# Patient Record
Sex: MALE | Race: WHITE | NOT HISPANIC OR LATINO | Employment: OTHER | URBAN - METROPOLITAN AREA
[De-identification: names, ages, dates, MRNs, and addresses within clinical notes are randomized per-mention and may not be internally consistent; named-entity substitution may affect disease eponyms.]

---

## 2021-11-09 ENCOUNTER — HISTORICAL (OUTPATIENT)
Dept: RADIOLOGY | Facility: HOSPITAL | Age: 56
End: 2021-11-09

## 2023-09-24 ENCOUNTER — HOSPITAL ENCOUNTER (EMERGENCY)
Facility: HOSPITAL | Age: 58
Discharge: HOME | End: 2023-09-25
Attending: STUDENT IN AN ORGANIZED HEALTH CARE EDUCATION/TRAINING PROGRAM
Payer: COMMERCIAL

## 2023-09-24 DIAGNOSIS — K52.9 ENTERITIS: ICD-10-CM

## 2023-09-24 DIAGNOSIS — R06.02 SOB (SHORTNESS OF BREATH): ICD-10-CM

## 2023-09-24 DIAGNOSIS — R11.2 NAUSEA AND VOMITING, UNSPECIFIED VOMITING TYPE: ICD-10-CM

## 2023-09-24 DIAGNOSIS — R10.9 ABDOMINAL PAIN, UNSPECIFIED ABDOMINAL LOCATION: Primary | ICD-10-CM

## 2023-09-24 LAB
ALBUMIN SERPL-MCNC: 4 G/DL (ref 3.5–5)
ALBUMIN/GLOB SERPL: 1.2 RATIO (ref 1.1–2)
ALP SERPL-CCNC: 140 UNIT/L (ref 40–150)
ALT SERPL-CCNC: 13 UNIT/L (ref 0–55)
APPEARANCE UR: CLEAR
AST SERPL-CCNC: 16 UNIT/L (ref 5–34)
BACTERIA #/AREA URNS AUTO: NORMAL /HPF
BASOPHILS # BLD AUTO: 0.1 X10(3)/MCL
BASOPHILS NFR BLD AUTO: 0.7 %
BILIRUB SERPL-MCNC: 0.4 MG/DL
BILIRUB UR QL STRIP.AUTO: NEGATIVE
BNP BLD-MCNC: 18.2 PG/ML
BUN SERPL-MCNC: 15.4 MG/DL (ref 8.4–25.7)
CALCIUM SERPL-MCNC: 8.9 MG/DL (ref 8.4–10.2)
CHLORIDE SERPL-SCNC: 106 MMOL/L (ref 98–107)
CO2 SERPL-SCNC: 24 MMOL/L (ref 22–29)
COLOR UR AUTO: YELLOW
CREAT SERPL-MCNC: 0.89 MG/DL (ref 0.73–1.18)
EOSINOPHIL # BLD AUTO: 0.5 X10(3)/MCL (ref 0–0.9)
EOSINOPHIL NFR BLD AUTO: 3.7 %
ERYTHROCYTE [DISTWIDTH] IN BLOOD BY AUTOMATED COUNT: 14.3 % (ref 11.5–17)
GFR SERPLBLD CREATININE-BSD FMLA CKD-EPI: >60 MLS/MIN/1.73/M2
GLOBULIN SER-MCNC: 3.3 GM/DL (ref 2.4–3.5)
GLUCOSE SERPL-MCNC: 104 MG/DL (ref 74–100)
GLUCOSE UR QL STRIP.AUTO: NEGATIVE
HCT VFR BLD AUTO: 41.1 % (ref 42–52)
HGB BLD-MCNC: 13.2 G/DL (ref 14–18)
IMM GRANULOCYTES # BLD AUTO: 0.04 X10(3)/MCL (ref 0–0.04)
IMM GRANULOCYTES NFR BLD AUTO: 0.3 %
KETONES UR QL STRIP.AUTO: NEGATIVE
LEUKOCYTE ESTERASE UR QL STRIP.AUTO: NEGATIVE
LYMPHOCYTES # BLD AUTO: 1.88 X10(3)/MCL (ref 0.6–4.6)
LYMPHOCYTES NFR BLD AUTO: 13.8 %
MCH RBC QN AUTO: 26.9 PG (ref 27–31)
MCHC RBC AUTO-ENTMCNC: 32.1 G/DL (ref 33–36)
MCV RBC AUTO: 83.7 FL (ref 80–94)
MONOCYTES # BLD AUTO: 1.22 X10(3)/MCL (ref 0.1–1.3)
MONOCYTES NFR BLD AUTO: 9 %
NEUTROPHILS # BLD AUTO: 9.85 X10(3)/MCL (ref 2.1–9.2)
NEUTROPHILS NFR BLD AUTO: 72.5 %
NITRITE UR QL STRIP.AUTO: NEGATIVE
NRBC BLD AUTO-RTO: 0 %
PH UR STRIP.AUTO: 5.5 [PH]
PLATELET # BLD AUTO: 204 X10(3)/MCL (ref 130–400)
PMV BLD AUTO: 11.7 FL (ref 7.4–10.4)
POTASSIUM SERPL-SCNC: 4.1 MMOL/L (ref 3.5–5.1)
PROT SERPL-MCNC: 7.3 GM/DL (ref 6.4–8.3)
PROT UR QL STRIP.AUTO: NEGATIVE
RBC # BLD AUTO: 4.91 X10(6)/MCL (ref 4.7–6.1)
RBC #/AREA URNS AUTO: <5 /HPF
RBC UR QL AUTO: NEGATIVE
SODIUM SERPL-SCNC: 140 MMOL/L (ref 136–145)
SP GR UR STRIP.AUTO: 1.02 (ref 1–1.03)
SQUAMOUS #/AREA URNS AUTO: <5 /HPF
TROPONIN I SERPL-MCNC: <0.01 NG/ML (ref 0–0.04)
UROBILINOGEN UR STRIP-ACNC: 0.2
WBC # SPEC AUTO: 13.59 X10(3)/MCL (ref 4.5–11.5)
WBC #/AREA URNS AUTO: <5 /HPF

## 2023-09-24 PROCEDURE — 80053 COMPREHEN METABOLIC PANEL: CPT | Performed by: STUDENT IN AN ORGANIZED HEALTH CARE EDUCATION/TRAINING PROGRAM

## 2023-09-24 PROCEDURE — 83880 ASSAY OF NATRIURETIC PEPTIDE: CPT | Performed by: STUDENT IN AN ORGANIZED HEALTH CARE EDUCATION/TRAINING PROGRAM

## 2023-09-24 PROCEDURE — 84484 ASSAY OF TROPONIN QUANT: CPT | Performed by: STUDENT IN AN ORGANIZED HEALTH CARE EDUCATION/TRAINING PROGRAM

## 2023-09-24 PROCEDURE — 93005 ELECTROCARDIOGRAM TRACING: CPT

## 2023-09-24 PROCEDURE — 99285 EMERGENCY DEPT VISIT HI MDM: CPT | Mod: 25

## 2023-09-24 PROCEDURE — 81001 URINALYSIS AUTO W/SCOPE: CPT | Performed by: STUDENT IN AN ORGANIZED HEALTH CARE EDUCATION/TRAINING PROGRAM

## 2023-09-24 PROCEDURE — 85025 COMPLETE CBC W/AUTO DIFF WBC: CPT | Performed by: STUDENT IN AN ORGANIZED HEALTH CARE EDUCATION/TRAINING PROGRAM

## 2023-09-25 VITALS
DIASTOLIC BLOOD PRESSURE: 62 MMHG | HEART RATE: 68 BPM | BODY MASS INDEX: 30.06 KG/M2 | WEIGHT: 210 LBS | RESPIRATION RATE: 18 BRPM | TEMPERATURE: 99 F | OXYGEN SATURATION: 100 % | HEIGHT: 70 IN | SYSTOLIC BLOOD PRESSURE: 124 MMHG

## 2023-09-25 PROCEDURE — 96375 TX/PRO/DX INJ NEW DRUG ADDON: CPT

## 2023-09-25 PROCEDURE — 96374 THER/PROPH/DIAG INJ IV PUSH: CPT | Mod: 59

## 2023-09-25 PROCEDURE — 96361 HYDRATE IV INFUSION ADD-ON: CPT

## 2023-09-25 PROCEDURE — 25500020 PHARM REV CODE 255: Performed by: STUDENT IN AN ORGANIZED HEALTH CARE EDUCATION/TRAINING PROGRAM

## 2023-09-25 PROCEDURE — 63600175 PHARM REV CODE 636 W HCPCS: Performed by: STUDENT IN AN ORGANIZED HEALTH CARE EDUCATION/TRAINING PROGRAM

## 2023-09-25 RX ORDER — ONDANSETRON 2 MG/ML
4 INJECTION INTRAMUSCULAR; INTRAVENOUS
Status: COMPLETED | OUTPATIENT
Start: 2023-09-25 | End: 2023-09-25

## 2023-09-25 RX ORDER — HYDROMORPHONE HYDROCHLORIDE 2 MG/ML
1 INJECTION, SOLUTION INTRAMUSCULAR; INTRAVENOUS; SUBCUTANEOUS
Status: COMPLETED | OUTPATIENT
Start: 2023-09-25 | End: 2023-09-25

## 2023-09-25 RX ORDER — HYDROCODONE BITARTRATE AND ACETAMINOPHEN 5; 325 MG/1; MG/1
1 TABLET ORAL EVERY 12 HOURS PRN
Qty: 10 TABLET | Refills: 0 | Status: SHIPPED | OUTPATIENT
Start: 2023-09-25 | End: 2023-09-30

## 2023-09-25 RX ORDER — ONDANSETRON 4 MG/1
4 TABLET, ORALLY DISINTEGRATING ORAL EVERY 8 HOURS PRN
Qty: 15 TABLET | Refills: 0 | Status: SHIPPED | OUTPATIENT
Start: 2023-09-25 | End: 2023-09-30

## 2023-09-25 RX ADMIN — ONDANSETRON 4 MG: 2 INJECTION INTRAMUSCULAR; INTRAVENOUS at 01:09

## 2023-09-25 RX ADMIN — HYDROMORPHONE HYDROCHLORIDE 1 MG: 2 INJECTION INTRAMUSCULAR; INTRAVENOUS; SUBCUTANEOUS at 01:09

## 2023-09-25 RX ADMIN — IOPAMIDOL 100 ML: 755 INJECTION, SOLUTION INTRAVENOUS at 01:09

## 2023-09-25 RX ADMIN — SODIUM CHLORIDE, POTASSIUM CHLORIDE, SODIUM LACTATE AND CALCIUM CHLORIDE 1000 ML: 600; 310; 30; 20 INJECTION, SOLUTION INTRAVENOUS at 01:09

## 2023-09-25 NOTE — DISCHARGE INSTRUCTIONS
Follow-up with the primary care physician.     Keep existing appointment with your gastroenterologist for colonoscopy and endoscopy.      Return to the emergency department if any fever, worsening abdominal pain, nausea, vomiting, difficulty breathing, headache, or any other concerns.

## 2023-09-25 NOTE — ED PROVIDER NOTES
"Encounter Date: 9/24/2023    SCRIBE #1 NOTE: I, Ian Teresadioneeleonora, am scribing for, and in the presence of,  Ángel Newsome MD. I have scribed the following portions of the note - Other sections scribed: HPI,ROS,PE.       History     Chief Complaint   Patient presents with    Abdominal Pain     RL and LL Quadrant pain that radiates to his back x 4 hours, w/ intermittent nausea. denies fever, dysuria, hematuria, vomiting. Pt denied any medical hx.. When told the waiting time, pts wife then stated he was SOB, and became very rude, stating "You didn't ask if he was actually SOB", when pt was asked what specifically prompted him to the ED, his initial complaint was only lower abd pain, pt and wife very rude in triage.      57 y/o male with PMHx of GERD presents to ED c/o abdominal pain onset 9/24. Pt reports associated symptoms of nausea and diarrhea. He denies any fever or vomiting. He reports the pain radiates to his back. He states he has a hiatal hernia. He reports having 2x hernia surgeries in the past.    The history is provided by the patient. No  was used.   Abdominal Pain  The current episode started today. The abdominal pain is generalized. The abdominal pain radiates to the back. The other symptoms of the illness include nausea and diarrhea. The other symptoms of the illness do not include fever, shortness of breath, vomiting or dysuria.   The diarrhea began today.   Significant associated medical issues include GERD.     Review of patient's allergies indicates:   Allergen Reactions    Latex, natural rubber     Opioids - morphine analogues Hives    Promethazine      "Feels like someone takes an ice pick to my joints"    Soma [carisoprodol]      History reviewed. No pertinent past medical history.  History reviewed. No pertinent surgical history.  History reviewed. No pertinent family history.     Review of Systems   Constitutional:  Negative for fever.   HENT:  Negative for sore throat.  "   Eyes:  Negative for visual disturbance.   Respiratory:  Negative for shortness of breath.    Cardiovascular:  Negative for chest pain.   Gastrointestinal:  Positive for abdominal pain, diarrhea and nausea. Negative for vomiting.   Genitourinary:  Negative for dysuria.   Musculoskeletal:  Negative for joint swelling.   Skin:  Negative for rash.   Neurological:  Negative for weakness.   Psychiatric/Behavioral:  Negative for confusion.    All other systems reviewed and are negative.      Physical Exam     Initial Vitals [09/24/23 2110]   BP Pulse Resp Temp SpO2   138/83 76 19 98.6 °F (37 °C) 98 %      MAP       --         Physical Exam    Nursing note and vitals reviewed.  Constitutional: He appears well-developed and well-nourished. He is not diaphoretic. No distress.   HENT:   Head: Normocephalic and atraumatic.   Eyes: Conjunctivae and EOM are normal. Pupils are equal, round, and reactive to light.   Neck:   Normal range of motion.  Cardiovascular:  Normal rate, regular rhythm, normal heart sounds and intact distal pulses.           No murmur heard.  Pulmonary/Chest: Breath sounds normal. No respiratory distress. He has no wheezes. He has no rales.   Abdominal: Abdomen is soft. He exhibits no distension. There is abdominal tenderness (diffuse).   Musculoskeletal:         General: No tenderness or edema. Normal range of motion.      Cervical back: Normal range of motion.      Comments: Left BKA     Neurological: He is alert and oriented to person, place, and time. No cranial nerve deficit.   Skin: Skin is warm and dry. Capillary refill takes less than 2 seconds. No rash noted. No erythema.   Psychiatric: He has a normal mood and affect.         ED Course   Procedures  Labs Reviewed   COMPREHENSIVE METABOLIC PANEL - Abnormal; Notable for the following components:       Result Value    Glucose Level 104 (*)     All other components within normal limits   CBC WITH DIFFERENTIAL - Abnormal; Notable for the following  components:    WBC 13.59 (*)     Hgb 13.2 (*)     Hct 41.1 (*)     MCH 26.9 (*)     MCHC 32.1 (*)     MPV 11.7 (*)     Neut # 9.85 (*)     All other components within normal limits   URINALYSIS, REFLEX TO URINE CULTURE - Normal   B-TYPE NATRIURETIC PEPTIDE - Normal   TROPONIN I - Normal   URINALYSIS, MICROSCOPIC - Normal   CBC W/ AUTO DIFFERENTIAL    Narrative:     The following orders were created for panel order CBC auto differential.  Procedure                               Abnormality         Status                     ---------                               -----------         ------                     CBC with Differential[[id number]]       Abnormal            Final result                 Please view results for these tests on the individual orders.     EKG Readings: (Independently Interpreted)   Initial Reading: No STEMI. Rhythm: Normal Sinus Rhythm. Heart Rate: 80. Ectopy: No Ectopy. Conduction: LAFB. ST Segments: Normal ST Segments. T Waves: Normal. Axis: Normal. Clinical Impression: Left Ventricular Hypertrophy (LDH)   Taken at 2113.       Imaging Results              CT Abdomen Pelvis With Contrast (Preliminary result)  Result time 09/25/23 01:36:09      Preliminary result by Ramón Mejía MD (09/25/23 01:36:09)                   Narrative:    START OF REPORT:  Technique: CT of the abdomen and pelvis was performed with axial images as well as sagittal and coronal reconstruction images with intravenous contrast.    Comparison: Comparison is with study dated 2021-11-09 17:51:01.    Clinical History: RL and LL Quadrant pain that radiates to his back x 4 hours, w/ intermittent nausea. denies fever, dysuria, hematuria, vomiting. Pt denied any medical hx.    Dosage Information: Automated Exposure Control was utilized.    Findings:  Thorax:  Lungs: There is mild nonspecific dependent change at the lung bases.  Pleura: No effusions or thickening are seen.  Heart: The heart size is within normal  limits.  Abdomen:  Abdominal Wall: Multiple surgical clips are again seen in the ventral mid and lower abdominal wall.  Liver: The liver appears unremarkable.  Biliary System: No intrahepatic or extrahepatic biliary duct dilatation is seen.  Gallbladder: The gallbladder is not identified. Please correlate with surgically history.  Pancreas: The pancreas appears unremarkable.  Spleen: The spleen appears unremarkable.  Adrenals: The adrenal glands appear unremarkable.  Kidneys: The kidneys appear unremarkable with no stones cysts masses or hydronephrosis.  Aorta: There is mild calcification of the abdominal aorta and its branches.  IVC: Unremarkable. An IVC filter is in place. Similar findings are also noted on the prior examination.  Bowel: Small bowel anastomotic sutures are again seen in the anterior mid abdomen. There is mild focal distention of the small bowel loop at the anastomotic site, and may reflect an atonic segment. There is mild circumferential wall thickening of multiple jejunal loops in the left mid abdomen. There is surrounding fat stranding with trace interloop free fluid (series 2, images 38-50). These findings may reflect enteritis. The rest of the bowel loops appear unremarkable. No definitive bowel obstruction is identified.  Esophagus: The visualized esophagus appears unremarkable.  Stomach: Postoperative changes are again identified in the stomach, consistent with gastric sleeve.  Duodenum: Unremarkable appearing duodenum.  Colon: There is moderate stool in the colon which could reflect an element of constipation.  Appendix: No appendix is identified.  Peritoneum: No free intraperitoneal air is seen.    Pelvis:  Bladder: The bladder appears unremarkable.  Male:  Prostate gland: The prostate gland appears unremarkable.    Bony structures:  Dorsal Spine: There is mild spondylosis of the visualized dorsal spine. There are pars interarticularis defects at L5 bilaterally with grade I anterolisthesis  of L5 over S1. Similar findings are also noted on the prior examination.  Bony Pelvis: The visualized bony structures of the pelvis appear unremarkable.      Impression:  1. Small bowel anastomotic sutures are again seen in the anterior mid abdomen. There is mild focal distention of the small bowel loop at the anastomotic site, and may reflect an atonic segment. There is mild circumferential wall thickening of multiple jejunal loops in the left mid abdomen. There is surrounding fat stranding with trace interloop free fluid (series 2, images 38-50). These findings may reflect enteritis.  2. Details and other findings as discussed above.                                         Medications   lactated ringers bolus 1,000 mL (0 mLs Intravenous Stopped 9/25/23 0247)   HYDROmorphone (PF) injection 1 mg (1 mg Intravenous Given 9/25/23 0145)   ondansetron injection 4 mg (4 mg Intravenous Given 9/25/23 0146)   iopamidoL (ISOVUE-370) injection 100 mL (100 mLs Intravenous Given 9/25/23 0133)             Scribe Attestation:   Scribe #1: I performed the above scribed service and the documentation accurately describes the services I performed. I attest to the accuracy of the note.    Attending Attestation:           Physician Attestation for Scribe:  Physician Attestation Statement for Scribe #1: I, Ángel Newsome MD, reviewed documentation, as scribed by Ian You in my presence, and it is both accurate and complete.         Medical Decision Making  Problems Addressed:  Abdominal pain, unspecified abdominal location: acute illness or injury that poses a threat to life or bodily functions  Enteritis: acute illness or injury that poses a threat to life or bodily functions  Nausea and vomiting, unspecified vomiting type: acute illness or injury that poses a threat to life or bodily functions  SOB (shortness of breath): acute illness or injury that poses a threat to life or bodily functions    Amount and/or Complexity of Data  Reviewed  Labs: ordered.  Radiology: ordered and independent interpretation performed.  ECG/medicine tests: ordered and independent interpretation performed.    Risk  Prescription drug management.  Parenteral controlled substances.                      Medical Decision Making:   History:   I obtained history from: someone other than patient.       <> Summary of History: Collateral from family member at bedside.  Old Medical Records: I decided to obtain old medical records.  Old Records Summarized: records from clinic visits, records from previous admission(s) and records from another hospital.       <> Summary of Records: Reviewed old records, history of previous hernia surgery.  Initial Assessment:   Abdominal pain  Differential Diagnosis:   Judging by the patient's chief complaint and pertinent history, the patient has the following possible differential diagnoses, including but not limited to the following.  Some of these are deemed to be lower likelihood and some more likely based on my physical exam and history combined with possible lab work and/or imaging studies.   Please see the pertinent studies, and refer to the HPI.  Some of these diagnoses will take further evaluation to fully rule out, perhaps as an outpatient and the patient was encouraged to follow up when discharged for more comprehensive evaluation.    Small-bowel obstruction, appendicitis, biliary disease, diverticulitis, ACS, intraabdominal abscess, retroperitoneal abscess, gastritis, gastroenteritis, hepatitis, hernia, pancreatitis, inflammatory bowel disease, PUD, constipation, nephrolithiasis, GERD, IBS    Independently Interpreted Test(s):   I have ordered and independently interpreted EKG Reading(s) - see prior notes  Clinical Tests:   Lab Tests: Ordered and Reviewed  Radiological Study: Reviewed and Ordered  Medical Tests: Reviewed and Ordered  ED Management:    Patient is a 58-year-old male who presents to emergency department for diffuse  abdominal pain.  See HPI.  See physical exam.  Given IV fluids, pain and nausea medication with improvement in his symptoms.  Lab work as noted.  Imaging obtained with evidence of enteritis.  No definitive bowel obstruction noted.  Patient resting comfortably on reassessment.  In no acute distress.  Nausea controlled. Reassessed patient.  Patient is resting comfortably.  Discussed all results.  Discussed need for follow-up.  Discussed return precautions.  Answered all questions at this time.  Hemodynamically stable for continued outpatient management with strict return precautions.  Patient and family verbalized understanding agreed to plan.  Patient has a follow-up appointment with a gastroenterologist.  Patient has a follow-up appointment with plan for colonoscopy and endoscopy on October 10th.        Clinical Impression:   Final diagnoses:  [R06.02] SOB (shortness of breath)  [R10.9] Abdominal pain, unspecified abdominal location - periumbilical (Primary)  [R11.2] Nausea and vomiting, unspecified vomiting type  [K52.9] Enteritis        ED Disposition Condition    Discharge Stable          ED Prescriptions       Medication Sig Dispense Start Date End Date Auth. Provider    HYDROcodone-acetaminophen (NORCO) 5-325 mg per tablet Take 1 tablet by mouth every 12 (twelve) hours as needed for Pain. 10 tablet 9/25/2023 9/30/2023 Ángel Newsome MD    ondansetron (ZOFRAN-ODT) 4 MG TbDL Take 1 tablet (4 mg total) by mouth every 8 (eight) hours as needed. 15 tablet 9/25/2023 9/30/2023 Ángel Newsome MD          Follow-up Information    None          Ángel Newsome MD  09/25/23 2127

## 2024-03-12 ENCOUNTER — HOSPITAL ENCOUNTER (EMERGENCY)
Facility: HOSPITAL | Age: 59
Discharge: HOME | End: 2024-03-12
Attending: STUDENT IN AN ORGANIZED HEALTH CARE EDUCATION/TRAINING PROGRAM
Payer: COMMERCIAL

## 2024-03-12 VITALS
RESPIRATION RATE: 20 BRPM | WEIGHT: 205 LBS | HEART RATE: 87 BPM | TEMPERATURE: 98 F | OXYGEN SATURATION: 95 % | HEIGHT: 70 IN | SYSTOLIC BLOOD PRESSURE: 136 MMHG | BODY MASS INDEX: 29.35 KG/M2 | DIASTOLIC BLOOD PRESSURE: 77 MMHG

## 2024-03-12 DIAGNOSIS — S82.122D CLOSED FRACTURE OF LATERAL PORTION OF LEFT TIBIAL PLATEAU WITH ROUTINE HEALING, SUBSEQUENT ENCOUNTER: Primary | ICD-10-CM

## 2024-03-12 PROCEDURE — 29505 APPLICATION LONG LEG SPLINT: CPT | Mod: LT

## 2024-03-12 PROCEDURE — 25000003 PHARM REV CODE 250: Performed by: PHYSICIAN ASSISTANT

## 2024-03-12 PROCEDURE — 99284 EMERGENCY DEPT VISIT MOD MDM: CPT | Mod: 25

## 2024-03-12 RX ORDER — HYDROCODONE BITARTRATE AND ACETAMINOPHEN 10; 325 MG/1; MG/1
1 TABLET ORAL
Status: COMPLETED | OUTPATIENT
Start: 2024-03-12 | End: 2024-03-12

## 2024-03-12 RX ORDER — HYDROCODONE BITARTRATE AND ACETAMINOPHEN 10; 325 MG/1; MG/1
1 TABLET ORAL EVERY 6 HOURS PRN
Qty: 20 TABLET | Refills: 0 | Status: SHIPPED | OUTPATIENT
Start: 2024-03-12 | End: 2024-03-17

## 2024-03-12 RX ADMIN — HYDROCODONE BITARTRATE AND ACETAMINOPHEN 1 TABLET: 10; 325 TABLET ORAL at 03:03

## 2024-03-12 NOTE — ED PROVIDER NOTES
"Encounter Date: 3/12/2024       History     Chief Complaint   Patient presents with    Fall     Pt reports falling onto BKA on L leg today. Went to UC and told he has a fx and told to go to PCP. Pt reports unable to see PCP today so came here       58 y.o. male with a history of left BKA presents to the ED with left leg pain s/t fall this morning. States he went to urgent care and had XR that showed possible fracture of left tibial stump; told to f/u with PCP however they are closed.  Patient states they gave him a tramadol however he took that with no symptom relief.  Denies any other injury.  Notes swelling to the stump as well.    The history is provided by the patient. No  was used.     Review of patient's allergies indicates:   Allergen Reactions    Latex, natural rubber     Opioids - morphine analogues Hives    Promethazine      "Feels like someone takes an ice pick to my joints"    Soma [carisoprodol]      No past medical history on file.  No past surgical history on file.  No family history on file.     Review of Systems   Constitutional:  Negative for fever.   HENT:  Negative for sore throat.    Respiratory:  Negative for shortness of breath.    Cardiovascular:  Negative for chest pain.   Gastrointestinal:  Negative for nausea.   Genitourinary:  Negative for dysuria.   Musculoskeletal:  Positive for arthralgias. Negative for back pain.   Skin:  Negative for rash.   Neurological:  Negative for weakness.   Hematological:  Does not bruise/bleed easily.   All other systems reviewed and are negative.      Physical Exam     Initial Vitals [03/12/24 1520]   BP Pulse Resp Temp SpO2   136/77 87 18 98.3 °F (36.8 °C) 95 %      MAP       --         Physical Exam    Nursing note and vitals reviewed.  Constitutional: He appears well-developed and well-nourished.   HENT:   Head: Normocephalic and atraumatic.   Eyes: EOM are normal. Pupils are equal, round, and reactive to light.   Neck: Neck supple. "   Normal range of motion.  Cardiovascular:  Normal rate, regular rhythm, normal heart sounds and intact distal pulses.           Pulmonary/Chest: Breath sounds normal.   Musculoskeletal:         General: Normal range of motion.      Cervical back: Normal range of motion and neck supple.      Comments: Left BKA noted, swelling and tenderness upon palpation to the stump, sensation intact, no erythema or wound noted     Neurological: He is alert and oriented to person, place, and time. He has normal strength. GCS score is 15. GCS eye subscore is 4. GCS verbal subscore is 5. GCS motor subscore is 6.   Skin: Skin is warm and dry. Capillary refill takes less than 2 seconds.   Psychiatric: He has a normal mood and affect.         ED Course   Procedures  Labs Reviewed - No data to display       Imaging Results               CT Knee Without Contrast Left (Final result)  Result time 03/12/24 16:08:36      Final result by Silvino Barrera MD (03/12/24 16:08:36)                   Narrative:    EXAMINATION  CT KNEE WITHOUT CONTRAST LEFT    CLINICAL HISTORY  Fracture, knee;    TECHNIQUE  Non-contrast helical-acquisition CT images of the left knee were obtained.  Multiplanar reconstructions accomplished by a CT technologist at a separate workstation, pushed to PACS for physician review.    COMPARISON  No prior cross-sectional imaging or baseline radiographs are available at the time of initial interpretation.    FINDINGS  Images were reviewed in bone and soft tissue windows.    Exam quality: adequate for evaluation    There is widespread appearance of bone demineralization, which could limit sensitivity for detection of subtle, nondisplaced fractures.  Region arthritic changes are noted, as well as alterations of the below the knee amputation.  No definite acutely displaced fracture is identified.  However, there is nondisplaced focal cortical disruption with subjacent trabecular irregularity through the lateral tibial plateau  (series 8, images 65-80; series 10, images 35-41; series 12, images 39-46).  No associated tibial plateau depression is identified.  No other convincing evidence of acute acute osseous injury appreciated.  Visualized joints are congruent.  There is a moderate knee effusion with associated fat-fluid level indicative of lipohemarthrosis.    Disorganized fluid consistent with contusion and edema noted through the amputation stump subcutaneous tissues.  There is no expansile hematoma or evidence of drainable fluid collection.  Regional musculature and tendinous structures are grossly intact by limited CT assessment.  Widespread appearance of muscle fatty atrophy noted, predominately involving the medial quadriceps and hamstring components.    IMPRESSION  1. Subtle, nondisplaced fracture of the lateral tibial plateau with associated moderate lipohemarthrosis.  2. No other convincing acute osseous injury is identified.  3. Additional secondary details discussed above.  ==========    This report was flagged in Epic as abnormal.    RADIATION DOSE  Automated tube current modulation, weight-based exposure dosing, and/or iterative reconstruction technique utilized to reach lowest reasonably achievable exposure rate.    DLP: 142 mGy*cm      Electronically signed by: Silvino Barrera  Date:    03/12/2024  Time:    16:08                                     Medications   HYDROcodone-acetaminophen  mg per tablet 1 tablet (1 tablet Oral Given 3/12/24 1530)     Medical Decision Making  Differential diagnosis:  Fracture, contusion     58 y.o. male with a history of left BKA presents to the ED with left leg pain s/t fall this morning. States he went to urgent care and had XR that showed possible fracture of left tibial stump; told to f/u with PCP however they are closed.  Patient states they gave him a tramadol however he took that with no symptom relief.  Denies any other injury.  Notes swelling to the stump as well.      Amount  and/or Complexity of Data Reviewed  Radiology: ordered.    Risk  Prescription drug management.               ED Course as of 03/12/24 1800   Tue Mar 12, 2024   1623 Spoke to Dr Moctezuma with ortho, will look at films [MA]   1625 Recommends place patient in immobilizer, remain NWB and go to office on Thursday at 8am  [MA]   1755 Knee immobilizer in place. States pain Is starting to ease up. Will d/c home with ortho instructions as well as pain  meds. States he has ibuprofen 800 at home that he can alternate as well  [MA]      ED Course User Index  [MA] Michael Morris PA-C                           Clinical Impression:  Final diagnoses:  [S82.122D] Closed fracture of lateral portion of left tibial plateau with routine healing, subsequent encounter (Primary)          ED Disposition Condition    Discharge Stable          ED Prescriptions       Medication Sig Dispense Start Date End Date Auth. Provider    HYDROcodone-acetaminophen (NORCO)  mg per tablet Take 1 tablet by mouth every 6 (six) hours as needed for Pain. 20 tablet 3/12/2024 3/17/2024 Michael Morris PA-C          Follow-up Information       Follow up With Specialties Details Why Contact Jus Doll, DO Orthopedic Surgery On 3/14/2024 follow up with Dr Moctezuma on Thursday at 8am [address]  [address]  [facility] [address]  [phone]      [facility] - Emergency Dept Emergency Medicine In 1 week If symptoms worsen [address] [facility] [id number] [phone]             Michael Morris PA-C  03/12/24 7103

## 2024-03-14 ENCOUNTER — OFFICE VISIT (OUTPATIENT)
Dept: ORTHOPEDICS | Facility: CLINIC | Age: 59
End: 2024-03-14
Payer: COMMERCIAL

## 2024-03-14 DIAGNOSIS — S82.122D CLOSED FRACTURE OF LATERAL PORTION OF LEFT TIBIAL PLATEAU WITH ROUTINE HEALING, SUBSEQUENT ENCOUNTER: ICD-10-CM

## 2024-03-14 PROCEDURE — 99499 UNLISTED E&M SERVICE: CPT | Mod: ,,, | Performed by: ORTHOPAEDIC SURGERY

## 2024-03-14 PROCEDURE — 27530 TREAT KNEE FRACTURE: CPT | Mod: LT,,, | Performed by: ORTHOPAEDIC SURGERY

## 2024-03-14 RX ORDER — TESTOSTERONE 25 MG/2.5G
GEL TRANSDERMAL
COMMUNITY

## 2024-03-14 RX ORDER — PANTOPRAZOLE SODIUM 40 MG/1
40 TABLET, DELAYED RELEASE ORAL
COMMUNITY
Start: 2024-02-07

## 2024-03-14 RX ORDER — LISINOPRIL 10 MG/1
10 TABLET ORAL
COMMUNITY
Start: 2024-02-06

## 2024-03-14 RX ORDER — OXYCODONE AND ACETAMINOPHEN 10; 325 MG/1; MG/1
1 TABLET ORAL EVERY 6 HOURS PRN
Qty: 12 TABLET | Refills: 0 | Status: SHIPPED | OUTPATIENT
Start: 2024-03-14 | End: 2024-03-17

## 2024-03-14 RX ORDER — GABAPENTIN 100 MG/1
200 CAPSULE ORAL 3 TIMES DAILY
COMMUNITY
Start: 2024-03-13

## 2024-03-14 NOTE — PROGRESS NOTES
Subjective:       Patient ID: Wilbert Valentin is a 58 y.o. male.  Chief Complaint   Patient presents with    Injury     3 days f/u, Lt tibial plateau fx, states was crutching at 5 am when he was going too fast causing him to fall on stump, states was given pain meds for relief, ambulating with crutches today, presents with brace on stump, has no other complaints at this time,          HPI:  Patient is 3 days out from a left tibial plateau fracture.  He was going to the bathroom in the morning tripped and fell directly in the left stump.  No soft tissue injury.  He has been a below-the-knee amputee for some time now been doing very well.  States his prosthesis fits very well has a suction type device.  He reports today with a significant other.  He has dull achy pain in the area but overall it is mild.    ROS:  Constitutional: Denies fever chills  Eyes: No change in vision  ENT: No ringing or current infections  CV: No chest pain  Resp: No labored breathing  MSK: Pain evident at site of injury located in HPI,   Integ: No signs of abrasions or lacerations  Neuro: No numbness or tingling  Lymphatic: No swelling outside the area of injury     Past Medical History:   Diagnosis Date    Hypertension       Past Surgical History:   Procedure Laterality Date    BELOW KNEE AMPUTATION OF LOWER EXTREMITY Left       Current Outpatient Medications on File Prior to Visit   Medication Sig Dispense Refill    gabapentin (NEURONTIN) 100 MG capsule Take 200 mg by mouth 3 (three) times daily.      HYDROcodone-acetaminophen (NORCO)  mg per tablet Take 1 tablet by mouth every 6 (six) hours as needed for Pain. 20 tablet 0    lisinopriL 10 MG tablet Take 10 mg by mouth.      pantoprazole (PROTONIX) 40 MG tablet 40 mg.      testosterone 1 % (25 mg/2.5gram) GlPk testosterone Take No date recorded No form recorded No frequency recorded No route recorded No set duration recorded No set duration amount recorded active No dosage strength  recorded No dosage strength units of measure recorded       No current facility-administered medications on file prior to visit.      Family History   Problem Relation Age of Onset    No Known Problems Mother     No Known Problems Father       Social History     Tobacco Use    Smoking status: Never    Smokeless tobacco: Never   Substance Use Topics    Alcohol use: Not Currently    Drug use: Never      No LMP for male patient.          Objective:      There were no vitals taken for this visit.  General the patient is alert and oriented x3 no acute distress nontoxic-appearing appropriate affect.    Constitutional: Vital signs are examined and stable.  Resp: No signs of labored breathing                 LLE: -Skin: No signs of new abrasions or lacerations, no scars. No skin breakdown           -MSK: Hip and Knee F/E,  5/5           -Neuro:  Sensation grossly intact           -Lymphatic: No signs of lymphadenopathy           -CV: Capillary refill is less than 2 seconds   .   There is no height or weight on file to calculate BMI.  Patient weight not recorded  Lab Results   Component Value Date     09/24/2023    K 4.1 09/24/2023    CO2 24 09/24/2023    CALCIUM 8.9 09/24/2023    BUN 15.4 09/24/2023      Lab Results   Component Value Date    HGB 13.2 (L) 09/24/2023    HCT 41.1 (L) 09/24/2023             Assessment:         1. Closed fracture of lateral portion of left tibial plateau with routine healing, subsequent encounter  Ambulatory referral/consult to Orthopedics              Plan:         No follow-ups on file.    Wilbert was seen today for injury.    Diagnoses and all orders for this visit:    Closed fracture of lateral portion of left tibial plateau with routine healing, subsequent encounter  -     Ambulatory referral/consult to Orthopedics          Independent Radiology ordered by other provider:  CT scan left knee showing a left lateral plateau fracture nondisplaced.  Patient above-the-knee amputation small  distal segment severe bone quality    Patient was like a new pair of crutches.  He has had multiple surgeries on his left stump over the years that has been doing well for at least 3 years.  He had a fall directly onto his left tibia does appear he has a fracture although relatively nondisplaced this area of his body is partially nonweightbearing.  We will recommend fracture care nonweightbearing for 8 weeks.  Follow up in 6-8 weeks.        This note/OR report was created with the assistance of  voice recognition software or phone  dictation.  There may be transcription errors as a result of using this technology however minimal. Effort has been made to assure accuracy of transcription but any obvious errors or omissions should be clarified with the author of the document.     No future appointments.        Jus Moctezuma DO  Orthopedic Trauma Surgery  03/14/2024

## 2024-05-13 ENCOUNTER — OFFICE VISIT (OUTPATIENT)
Dept: ORTHOPEDICS | Facility: CLINIC | Age: 59
End: 2024-05-13
Payer: MEDICARE

## 2024-05-13 ENCOUNTER — HOSPITAL ENCOUNTER (OUTPATIENT)
Dept: RADIOLOGY | Facility: CLINIC | Age: 59
Discharge: HOME | End: 2024-05-13
Attending: ORTHOPAEDIC SURGERY
Payer: COMMERCIAL

## 2024-05-13 VITALS
DIASTOLIC BLOOD PRESSURE: 79 MMHG | WEIGHT: 205 LBS | HEIGHT: 70 IN | HEART RATE: 80 BPM | BODY MASS INDEX: 29.35 KG/M2 | SYSTOLIC BLOOD PRESSURE: 135 MMHG

## 2024-05-13 DIAGNOSIS — S82.122D CLOSED FRACTURE OF LATERAL PORTION OF LEFT TIBIAL PLATEAU WITH ROUTINE HEALING, SUBSEQUENT ENCOUNTER: Primary | ICD-10-CM

## 2024-05-13 DIAGNOSIS — S82.122D CLOSED FRACTURE OF LATERAL PORTION OF LEFT TIBIAL PLATEAU WITH ROUTINE HEALING, SUBSEQUENT ENCOUNTER: ICD-10-CM

## 2024-05-13 PROCEDURE — 99024 POSTOP FOLLOW-UP VISIT: CPT | Mod: POP,,, | Performed by: PHYSICIAN ASSISTANT

## 2024-05-13 PROCEDURE — 73560 X-RAY EXAM OF KNEE 1 OR 2: CPT | Mod: LT,,, | Performed by: ORTHOPAEDIC SURGERY

## 2024-05-13 NOTE — PROGRESS NOTES
"Subjective:       Patient ID: Wilbert Valentin is a 58 y.o. male.  Chief Complaint   Patient presents with    Follow-up     8.5 wks f/u, Lt tib plateau fx. [facility] 3/14/24-6/12/24 ( left BKA), denies pain, ambulating with crutches, states has been doing stump , states swelling has gone down a lot, ready for prosthesis, has no other complaints at this time,         HPI    Patient presents for 8.5 week follow up lateral tibial plateau fracture left knee. He does have a BKA to this side. He is eager to begin progressive weight bearing with his prosthesis. He does have an orthotist that can help him with fitting. He has no pain at the fracture site today. Good range of motion. Otherwise no complaints. Swellign is greatly improved overall anc dontinues to use stump shaper.    ROS:  Constitutional: Denies fever chills  Eyes: No change in vision  ENT: No ringing or current infections  CV: No chest pain  Resp: No labored breathing  MSK: Pain evident at site of injury located in HPI,   Integ: No signs of abrasions or lacerations  Neuro: No numbness or tingling  Lymphatic: No swelling outside the area of injury     Current Outpatient Medications on File Prior to Visit   Medication Sig Dispense Refill    gabapentin (NEURONTIN) 100 MG capsule Take 200 mg by mouth 3 (three) times daily.      lisinopriL 10 MG tablet Take 10 mg by mouth.      pantoprazole (PROTONIX) 40 MG tablet 40 mg.      testosterone 1 % (25 mg/2.5gram) GlPk testosterone Take No date recorded No form recorded No frequency recorded No route recorded No set duration recorded No set duration amount recorded active No dosage strength recorded No dosage strength units of measure recorded       No current facility-administered medications on file prior to visit.          Objective:      /79   Pulse 80   Ht 5' 10" (1.778 m)   Wt 93 kg (205 lb 0.4 oz)   BMI 29.42 kg/m²   Physical Exam  General the patient is alert and oriented x3 no acute distress " "nontoxic-appearing appropriate affect.    Constitutional: Vital signs are examined and stable.  Resp: No signs of labored breathing    LLE: -Skin: Stump shaper in place, No signs of new abrasions or lacerations, no scars           -MSK: Hip and Knee F/E, EHL/FHL           -Neuro:  Sensation intact to light touch to BKA stump and fracture site           -Lymphatic: No signs of lymphadenopathy           -CV: skin is pink and well perfused grossly        Body mass index is 29.42 kg/m².  Ideal body weight: 73 kg (160 lb 15 oz)  Adjusted ideal body weight: 81 kg (178 lb 9.2 oz)  No results found for: "HGBA1C"  Hgb   Date Value Ref Range Status   09/24/2023 13.2 (L) 14.0 - 18.0 g/dL Final   07/24/2019 12.9 (L) 14.0 - 18.0 gm/dL Final     Hct   Date Value Ref Range Status   09/24/2023 41.1 (L) 42.0 - 52.0 % Final   07/24/2019 43.0 42.0 - 52.0 % Final     No results found for: "IRON"  No components found for: "FROLATE"  No results found for: "[id number]"  WBC   Date Value Ref Range Status   09/24/2023 13.59 (H) 4.50 - 11.50 x10(3)/mcL Final   07/24/2019 9.0 4.5 - 11.5 x10(3)/mcL Final       Radiology: 3 view x ray left knee: no hardware in place. Tibial plateau fracture remains non displaced without change as compared to prior CT scan in March. Continued consolidation present.         Assessment:         1. Closed fracture of lateral portion of left tibial plateau with routine healing, subsequent encounter  X-Ray Knee 1 or 2 View Left    Ambulatory referral/consult to Physical/Occupational Therapy    CANCELED: Ambulatory referral/consult to Physical/Occupational Therapy              Plan:         Follow up in about 2 months (around 7/13/2024), or if symptoms worsen or fail to improve.    Wilbert was seen today for follow-up.    Diagnoses and all orders for this visit:    Closed fracture of lateral portion of left tibial plateau with routine healing, subsequent encounter  -     X-Ray Knee 1 or 2 View Left; Future  -     " Cancel: Ambulatory referral/consult to Physical/Occupational Therapy; Future  -     Ambulatory referral/consult to Physical/Occupational Therapy; Future        -Begin progressive weight bearing to the LLE, ROMAT. Therapy order provided today for his use as needed.   - Doing well overall. Discussed risk of displacement. He will be 9 weeks out from surgery tomorrow.   - we will see him back in approx 2 months for repeat x rays and evaluation  -ED precautions given        Alexandra Blair Lemaire, PA-C [facility]   Orthopedic Trauma            Future Appointments   Date Time Provider Department Center   7/15/2024  8:15 AM Jus Moctezuma DO [facility]

## 2024-07-16 ENCOUNTER — HOSPITAL ENCOUNTER (OUTPATIENT)
Dept: RADIOLOGY | Facility: CLINIC | Age: 59
Discharge: HOME | End: 2024-07-16
Attending: ORTHOPAEDIC SURGERY
Payer: MEDICARE

## 2024-07-16 ENCOUNTER — OFFICE VISIT (OUTPATIENT)
Dept: ORTHOPEDICS | Facility: CLINIC | Age: 59
End: 2024-07-16
Payer: MEDICARE

## 2024-07-16 VITALS
HEIGHT: 70 IN | DIASTOLIC BLOOD PRESSURE: 71 MMHG | BODY MASS INDEX: 29.35 KG/M2 | WEIGHT: 205 LBS | HEART RATE: 93 BPM | RESPIRATION RATE: 18 BRPM | SYSTOLIC BLOOD PRESSURE: 133 MMHG

## 2024-07-16 DIAGNOSIS — S82.122D CLOSED FRACTURE OF LATERAL PORTION OF LEFT TIBIAL PLATEAU WITH ROUTINE HEALING, SUBSEQUENT ENCOUNTER: ICD-10-CM

## 2024-07-16 DIAGNOSIS — S82.122D CLOSED FRACTURE OF LATERAL PORTION OF LEFT TIBIAL PLATEAU WITH ROUTINE HEALING, SUBSEQUENT ENCOUNTER: Primary | ICD-10-CM

## 2024-07-16 PROCEDURE — 99213 OFFICE O/P EST LOW 20 MIN: CPT | Mod: ,,, | Performed by: ORTHOPAEDIC SURGERY

## 2024-07-16 PROCEDURE — 73560 X-RAY EXAM OF KNEE 1 OR 2: CPT | Mod: LT,,, | Performed by: ORTHOPAEDIC SURGERY

## 2024-07-16 NOTE — PROGRESS NOTES
"Subjective:       Patient ID: Wilbert Valentin is a 58 y.o. male.  Chief Complaint   Patient presents with    Left Lower Leg - Follow-up     4 month f/u left tib plateau fx, no complaints.         HPI    Patient presents for4mo follow up lateral tibial plateau fracture left knee. He does have a BKA to this side.  He has been weight-bearing.  Getting a new prosthesis at this time.  Has silicone stump Shaper on currently.  Minimal pain.  States the next prosthesis will fit above the current fracture.  No new symptoms.    ROS:  Constitutional: Denies fever chills  Eyes: No change in vision  ENT: No ringing or current infections  CV: No chest pain  Resp: No labored breathing  MSK: Pain evident at site of injury located in HPI,   Integ: No signs of abrasions or lacerations  Neuro: No numbness or tingling  Lymphatic: No swelling outside the area of injury     Current Outpatient Medications on File Prior to Visit   Medication Sig Dispense Refill    gabapentin (NEURONTIN) 100 MG capsule Take 200 mg by mouth 3 (three) times daily.      lisinopriL 10 MG tablet Take 10 mg by mouth.      pantoprazole (PROTONIX) 40 MG tablet 40 mg.      testosterone 1 % (25 mg/2.5gram) GlPk testosterone Take No date recorded No form recorded No frequency recorded No route recorded No set duration recorded No set duration amount recorded active No dosage strength recorded No dosage strength units of measure recorded       No current facility-administered medications on file prior to visit.          Objective:      /71   Pulse 93   Resp 18   Ht 5' 10" (1.778 m)   Wt 93 kg (205 lb 0.4 oz)   BMI 29.42 kg/m²   Physical Exam  General the patient is alert and oriented x3 no acute distress nontoxic-appearing appropriate affect.    Constitutional: Vital signs are examined and stable.  Resp: No signs of labored breathing    LLE: -Skin: Stump shaper still in place, No signs of new abrasions or lacerations, no scars           -MSK: Hip and Knee " "F/E, EHL/FHL           -Neuro:  Sensation intact to light touch to BKA stump and fracture site           -Lymphatic: No signs of lymphadenopathy           -CV: skin is pink and well perfused grossly        Body mass index is 29.42 kg/m².  Ideal body weight: 73 kg (160 lb 15 oz)  Adjusted ideal body weight: 81 kg (178 lb 9.2 oz)  No results found for: "HGBA1C"  Hgb   Date Value Ref Range Status   09/24/2023 13.2 (L) 14.0 - 18.0 g/dL Final   07/24/2019 12.9 (L) 14.0 - 18.0 gm/dL Final     Hct   Date Value Ref Range Status   09/24/2023 41.1 (L) 42.0 - 52.0 % Final   07/24/2019 43.0 42.0 - 52.0 % Final     No results found for: "IRON"  No components found for: "FROLATE"  No results found for: "[id number]"  WBC   Date Value Ref Range Status   09/24/2023 13.59 (H) 4.50 - 11.50 x10(3)/mcL Final   07/24/2019 9.0 4.5 - 11.5 x10(3)/mcL Final       Radiology: 3 view x ray left knee: no hardware in place. Tibial plateau fracture remains non displaced without change as compared to prior CT scan in March. Continued consolidation present.         Assessment:         1. Closed fracture of lateral portion of left tibial plateau with routine healing, subsequent encounter  X-Ray Knee 1 or 2 View Left              Plan:         No follow-ups on file.    Wilbert was seen today for follow-up.    Diagnoses and all orders for this visit:    Closed fracture of lateral portion of left tibial plateau with routine healing, subsequent encounter  -     X-Ray Knee 1 or 2 View Left; Future      Patient is doing very well.  Getting fitted for today the next prosthesis we will fit above the fracture site.  He is 3 months out.  We will let him weightbear as tolerated understands the risks and benefits of his short stump including possible need for revision amputation in the future.  Very happy with his care and we will follow up as needed.  He will continue use over-the-counter pain medication for pain as needed    This note/OR report was created with " the assistance of  voice recognition software or phone  dictation.  There may be transcription errors as a result of using this technology however minimal. Effort has been made to assure accuracy of transcription but any obvious errors or omissions should be clarified with the author of the document.       Jus Moctezuma, DO  Orthopedic Trauma Surgery         No future appointments.